# Patient Record
Sex: MALE | Race: WHITE | NOT HISPANIC OR LATINO | Employment: FULL TIME | URBAN - NONMETROPOLITAN AREA
[De-identification: names, ages, dates, MRNs, and addresses within clinical notes are randomized per-mention and may not be internally consistent; named-entity substitution may affect disease eponyms.]

---

## 2024-03-15 ENCOUNTER — OFFICE VISIT (OUTPATIENT)
Dept: SURGERY | Facility: CLINIC | Age: 54
End: 2024-03-15
Payer: COMMERCIAL

## 2024-03-15 VITALS
TEMPERATURE: 98 F | HEART RATE: 67 BPM | SYSTOLIC BLOOD PRESSURE: 140 MMHG | DIASTOLIC BLOOD PRESSURE: 72 MMHG | BODY MASS INDEX: 32.51 KG/M2 | HEIGHT: 72 IN | WEIGHT: 240 LBS

## 2024-03-15 DIAGNOSIS — Z98.890 HISTORY OF UMBILICAL HERNIA REPAIR: ICD-10-CM

## 2024-03-15 DIAGNOSIS — Z87.19 HISTORY OF UMBILICAL HERNIA REPAIR: ICD-10-CM

## 2024-03-15 DIAGNOSIS — R10.33 ABDOMINAL WALL PAIN IN PERIUMBILICAL REGION: Primary | ICD-10-CM

## 2024-03-15 PROCEDURE — 99204 OFFICE O/P NEW MOD 45 MIN: CPT | Performed by: SURGERY

## 2024-03-15 RX ORDER — FEBUXOSTAT 40 MG/1
40 TABLET, FILM COATED ORAL DAILY
COMMUNITY

## 2024-03-15 RX ORDER — PREGABALIN 75 MG/1
75 CAPSULE ORAL 3 TIMES DAILY
COMMUNITY
Start: 2024-03-12

## 2024-03-15 ASSESSMENT — ENCOUNTER SYMPTOMS: ABDOMINAL PAIN: 1

## 2024-03-15 NOTE — PROGRESS NOTES
Subjective   Patient ID: Valentin Howard is a 53 y.o. male who presents for abdominal wall pain  HPI   The patient's history dates back to 12 years ago when he underwent surgery for an umbilical hernia at the [facility].  He really did not have much symptoms at that time.  Since then he has had intermittent discomfort he claims to have some left-sided abdominal wall pain that will radiate up towards his upper chest.  He had no other abdominal complaints.    History reviewed. No pertinent past medical history.     Current Outpatient Medications on File Prior to Visit   Medication Sig Dispense Refill    pregabalin (Lyrica) 75 mg capsule Take 1 capsule (75 mg) by mouth 3 times a day.      febuxostat (Uloric) 40 mg tablet Take 1 tablet (40 mg) by mouth once daily.       No current facility-administered medications on file prior to visit.        Review of Systems   Gastrointestinal:  Positive for abdominal pain.   All other systems reviewed and are negative.      Vitals:    03/15/24 1431   BP: 140/72   Pulse: 67   Temp: 36.7 °C (98 °F)        Objective     Physical Exam  Vitals reviewed.   Constitutional:       Appearance: Normal appearance.   HENT:      Head: Normocephalic.   Cardiovascular:      Rate and Rhythm: Normal rate and regular rhythm.      Heart sounds: Normal heart sounds.   Pulmonary:      Effort: Pulmonary effort is normal.      Breath sounds: Normal breath sounds.   Abdominal:      General: Abdomen is flat. There is no distension.      Palpations: Abdomen is soft. There is no mass.      Tenderness: There is no abdominal tenderness. There is no guarding.      Hernia: No hernia is present.   Musculoskeletal:         General: Normal range of motion.      Cervical back: Normal range of motion.   Skin:     General: Skin is warm.   Neurological:      General: No focal deficit present.   Psychiatric:         Mood and Affect: Mood normal.         Problem List Items Addressed This Visit       Abdominal wall  pain in periumbilical region - Primary    History of umbilical hernia repair        Assessment/Plan   Plan-recommend CT of the abdomen pelvis without contrast  Follow-up in 4 weeks.    Jamshid Cartwright MD

## 2024-03-15 NOTE — PATIENT INSTRUCTIONS
You have some abdominal pain and had a previous hernia repair 12 years ago.  Will obtain your notes from the [facility] system.  I like to obtain a CT scan of the abdomen pelvis.  I will follow-up with you in 4 weeks

## 2024-03-25 ENCOUNTER — HOSPITAL ENCOUNTER (OUTPATIENT)
Dept: RADIOLOGY | Facility: HOSPITAL | Age: 54
Discharge: HOME | End: 2024-03-25
Payer: COMMERCIAL

## 2024-03-25 DIAGNOSIS — Z87.19 HISTORY OF UMBILICAL HERNIA REPAIR: ICD-10-CM

## 2024-03-25 DIAGNOSIS — R10.33 ABDOMINAL WALL PAIN IN PERIUMBILICAL REGION: ICD-10-CM

## 2024-03-25 DIAGNOSIS — Z98.890 HISTORY OF UMBILICAL HERNIA REPAIR: ICD-10-CM

## 2024-03-25 PROCEDURE — 74176 CT ABD & PELVIS W/O CONTRAST: CPT

## 2024-03-25 PROCEDURE — 74176 CT ABD & PELVIS W/O CONTRAST: CPT | Performed by: RADIOLOGY

## 2024-04-12 ENCOUNTER — OFFICE VISIT (OUTPATIENT)
Dept: SURGERY | Facility: CLINIC | Age: 54
End: 2024-04-12
Payer: COMMERCIAL

## 2024-04-12 VITALS
SYSTOLIC BLOOD PRESSURE: 155 MMHG | HEART RATE: 74 BPM | HEIGHT: 72 IN | WEIGHT: 241 LBS | TEMPERATURE: 98.4 F | DIASTOLIC BLOOD PRESSURE: 77 MMHG | BODY MASS INDEX: 32.64 KG/M2

## 2024-04-12 DIAGNOSIS — N32.89 BLADDER WALL THICKENING: ICD-10-CM

## 2024-04-12 DIAGNOSIS — M62.00 DIASTASIS OF MUSCLE: Primary | ICD-10-CM

## 2024-04-12 PROCEDURE — 99212 OFFICE O/P EST SF 10 MIN: CPT | Performed by: SURGERY

## 2024-04-12 NOTE — PATIENT INSTRUCTIONS
You do not have an obvious umbilical hernia.  I will follow this conservatively.  The diastases is a normal variant.  You do have some abnormal bladder wall thickening on the CAT scan and we will make a referral to urology for you.  I will see you as needed.

## 2024-04-12 NOTE — PROGRESS NOTES
Patient ID: Valentin Howard is a 53 y.o. male.  Following up for his possible umbilical hernia    Subjective   HPI  Patient had a CT scan which did not confirm an obvious umbilical hernia.  He does have a bulge when he sits up    Review of Systems   All other systems reviewed and are negative.      Objective   Physical Exam  Constitutional:       Appearance: Normal appearance.   Abdominal:      Palpations: Abdomen is soft. There is no mass.      Tenderness: There is no abdominal tenderness. There is no guarding.      Hernia: No hernia is present.     There is a diastases    Review of imaging CT scan performed 3/25/2024  IMPRESSION:  1.  No evidence of umbilical hernia. There are small bilateral fat  filled inguinal hernias.  2. Wall thickening of the anterior and superior wall of the urinary  bladder. Urology consult is recommended.  3. Hepatomegaly with fatty infiltration.  4. A left-sided IVC which is a normal variant.  5. A few bilateral nonobstructing renal calculi measured up to 5 mm.        Problem List Items Addressed This Visit       Diastasis of muscle - Primary    Bladder wall thickening        Assessment/Plan   You do not have an obvious umbilical hernia.  I will follow this conservatively.  The diastases is a normal variant.  You do have some abnormal bladder wall thickening on the CAT scan and we will make a referral to urology for you.  I will see you as needed.

## 2024-05-22 ENCOUNTER — OFFICE VISIT (OUTPATIENT)
Dept: UROLOGY | Facility: CLINIC | Age: 54
End: 2024-05-22
Payer: COMMERCIAL

## 2024-05-22 DIAGNOSIS — N40.1 BENIGN PROSTATIC HYPERPLASIA WITH LOWER URINARY TRACT SYMPTOMS, SYMPTOM DETAILS UNSPECIFIED: ICD-10-CM

## 2024-05-22 DIAGNOSIS — N32.89 BLADDER WALL THICKENING: ICD-10-CM

## 2024-05-22 DIAGNOSIS — N52.9 ERECTILE DYSFUNCTION, UNSPECIFIED ERECTILE DYSFUNCTION TYPE: Primary | ICD-10-CM

## 2024-05-22 DIAGNOSIS — Z12.5 PROSTATE CANCER SCREENING: ICD-10-CM

## 2024-05-22 PROCEDURE — 99204 OFFICE O/P NEW MOD 45 MIN: CPT | Performed by: STUDENT IN AN ORGANIZED HEALTH CARE EDUCATION/TRAINING PROGRAM

## 2024-05-22 RX ORDER — TADALAFIL 5 MG/1
5 TABLET ORAL DAILY
Qty: 30 TABLET | Refills: 11 | Status: SHIPPED | OUTPATIENT
Start: 2024-05-22 | End: 2025-05-22

## 2024-05-22 NOTE — LETTER
May 22, 2024     Jamshid Cartwright MD  [address]  [facility] [address]    Patient: Valentin Howard   YOB: 1970   Date of Visit: 5/22/2024       Dear Dr. Jamshid Cartwright MD:    Thank you for referring Valentin Howard to me for evaluation. Below are my notes for this consultation.  If you have questions, please do not hesitate to call me. I look forward to following your patient along with you.       Sincerely,     Scooby Del Cid MD      CC: No Recipients  ______________________________________________________________________________________    Subjective  Patient ID: Valentin Howard is a 53 y.o. male who presents for bladder wall thickening, referred by Dr. Cartwright.    HPI  53 y.o. male who presents for bladder wall thickening and ED.     No hx of urinary sxs. No dysuria or hematuria.     He had CT a/p ordered by Dr. Cartwright. This showed bladder wall thickening and kidney stones. Has never passed kidney stones.     He reports issues with ED. Difficult to get and also maintain erections. Has not tried medication. No hx of heart issues, on HTN medication.     Has not started PSA testing.     CT a/p 3/25/24.   IMPRESSION:  1.  No evidence of umbilical hernia. There are small bilateral fat  filled inguinal hernias.  2. Wall thickening of the anterior and superior wall of the urinary  bladder. Urology consult is recommended.  3. Hepatomegaly with fatty infiltration.  4. A left-sided IVC which is a normal variant.  5. A few bilateral nonobstructing renal calculi measured up to 5 mm.    Social History     Tobacco Use   • Smoking status: Never   • Smokeless tobacco: Current     Types: Chew   Vaping Use   • Vaping status: Never Used   Substance Use Topics   • Alcohol use: Not Currently   • Drug use: Never       Review of Systems  A complete review of systems was performed. All systems are noted to be negative unless indicated in the history of present illness, impression, active problem  list, or past histories.     Objective  Physical Exam  General: Well developed, well nourished, alert and cooperative, appears in no acute distress  Eyes: Non-injected conjunctiva, sclera clear, no proptosis  Cardiac: Extremities are warm and well perfused. No edema, cyanosis or pallor.   Lungs: Breathing is easy, non-labored. Speaking in clear and complete sentences. Normal diaphragmatic movement.  Abdomen: soft, non-tender  MSK: Ambulatory with steady gait, unassisted  Neuro: alert and oriented to person, place and time  Psych: Demonstrates good judgement and reason, without hallucinations, abnormal affect or abnormal behaviors.  Skin: no obvious lesions, no rashes.  : phallus circumcised, normal in size, no plaques or lesions. Testicles normal in size and texture, no masses  TAI: prostate enlarged, smooth surface, no nodules    Assessment/Plan  53 y.o. male who presents for bladder wall thickening and BPH with early ED.    No hx of urinary sxs. No dysuria or hematuria.     He had CT a/p ordered by Dr. Cartwright. This showed bladder wall thickening and kidney stones. Has never passed kidney stones.     He reports issues with ED. Difficult to get and also maintain erections. Has not tried medication. No hx of heart issues, on HTN medication.     I personally reviewed the medical records of the patient including the note of the referring physician including the CT images as well as report.  Wall thickening of the anterior and superior wall of the urinary  bladder.    We discussed obtaining cystoscopy to rule out any malignancy.     ED:  I explained to the patient that erectile dysfunction can be of organic or nonorganic etiologies. Organic etiologies includes vascular or neuropathic related to vasculopathy, diabetes, metabolic syndrome, smoking, anatomical such as penile plaques that can result in erectile dysfunction and penile curvature. Nonorganic etiologies include psychogenic reasons related to desire, proper  stimulation, and anxiety such as performance anxiety among other reasons.  The available treatments include oral treatment with PDE 5 inhibitors given either as a once daily low-dose or on-demand high-dose, with the possibility to combine both if tolerated. Intra-urethral suppositories are other options but are rarely used. Intracavernosal injections of prostaglandins or mixture of pharmacological agents to achieve erection on demand are usually relied on if PDE 5 inhibitors are not effective. The last resort will be penile prosthesis/implant, of which several models include.  I explained to the patient that I manage the first line of treatment with PDE 5 inhibitors, however I would refer him for a men's health nurse or urologist who specializes in this condition for discussion of more advanced treatment options whenever needed.  I went over the side effects of PDE 5 inhibitors which include headache, nasal congestion, facial flushing, GI discomfort and low back pain, in addition to the more concerning yet rare priapism which consists of more than 4 hours of erection that could lead to penile pain, and would be a medical emergency requiring presentation to the emergency department.  The patient understands the management plan, and asked questions that reflected his engagement in the discussion.    We discussed that, although guidelines indicate to start routine PSA age 55, we can start checking them at this point.     Plan:  PSA now.   Start Cialis 5 mg 1 pill daily.   Cystoscopy at next visit.     Diagnoses and all orders for this visit:  Erectile dysfunction, unspecified erectile dysfunction type  Bladder wall thickening  -     Referral to Urology  -     Cystoscopy; Future  Benign prostatic hyperplasia with lower urinary tract symptoms, symptom details unspecified  -     tadalafil (Cialis) 5 mg tablet; Take 1 tablet (5 mg) by mouth once daily.  Prostate cancer screening  -     Prostate Specific Antigen;  Future    Scribe Attestation  By signing my name below, I, Elodia Sharp, Scribjanelle attest that this documentation has been prepared under the direction and in the presence of Scooby Del Cid MD.

## 2024-05-22 NOTE — PROGRESS NOTES
Subjective   Patient ID: Valetnin Howard is a 53 y.o. male who presents for bladder wall thickening, referred by Dr. Cartwright.    HPI  53 y.o. male who presents for bladder wall thickening and ED.     No hx of urinary sxs. No dysuria or hematuria.     He had CT a/p ordered by Dr. Cartwright. This showed bladder wall thickening and kidney stones. Has never passed kidney stones.     He reports issues with ED. Difficult to get and also maintain erections. Has not tried medication. No hx of heart issues, on HTN medication.     Has not started PSA testing.     CT a/p 3/25/24.   IMPRESSION:  1.  No evidence of umbilical hernia. There are small bilateral fat  filled inguinal hernias.  2. Wall thickening of the anterior and superior wall of the urinary  bladder. Urology consult is recommended.  3. Hepatomegaly with fatty infiltration.  4. A left-sided IVC which is a normal variant.  5. A few bilateral nonobstructing renal calculi measured up to 5 mm.    Social History     Tobacco Use    Smoking status: Never    Smokeless tobacco: Current     Types: Chew   Vaping Use    Vaping status: Never Used   Substance Use Topics    Alcohol use: Not Currently    Drug use: Never       Review of Systems  A complete review of systems was performed. All systems are noted to be negative unless indicated in the history of present illness, impression, active problem list, or past histories.     Objective   Physical Exam  General: Well developed, well nourished, alert and cooperative, appears in no acute distress  Eyes: Non-injected conjunctiva, sclera clear, no proptosis  Cardiac: Extremities are warm and well perfused. No edema, cyanosis or pallor.   Lungs: Breathing is easy, non-labored. Speaking in clear and complete sentences. Normal diaphragmatic movement.  Abdomen: soft, non-tender  MSK: Ambulatory with steady gait, unassisted  Neuro: alert and oriented to person, place and time  Psych: Demonstrates good judgement and reason, without hallucinations,  abnormal affect or abnormal behaviors.  Skin: no obvious lesions, no rashes.  : phallus circumcised, normal in size, no plaques or lesions. Testicles normal in size and texture, no masses  TAI: prostate enlarged, smooth surface, no nodules    Assessment/Plan   53 y.o. male who presents for bladder wall thickening and BPH with early ED.    No hx of urinary sxs. No dysuria or hematuria.     He had CT a/p ordered by Dr. Cartwright. This showed bladder wall thickening and kidney stones. Has never passed kidney stones.     He reports issues with ED. Difficult to get and also maintain erections. Has not tried medication. No hx of heart issues, on HTN medication.     I personally reviewed the medical records of the patient including the note of the referring physician including the CT images as well as report.  Wall thickening of the anterior and superior wall of the urinary  bladder.    We discussed obtaining cystoscopy to rule out any malignancy.     ED:  I explained to the patient that erectile dysfunction can be of organic or nonorganic etiologies. Organic etiologies includes vascular or neuropathic related to vasculopathy, diabetes, metabolic syndrome, smoking, anatomical such as penile plaques that can result in erectile dysfunction and penile curvature. Nonorganic etiologies include psychogenic reasons related to desire, proper stimulation, and anxiety such as performance anxiety among other reasons.  The available treatments include oral treatment with PDE 5 inhibitors given either as a once daily low-dose or on-demand high-dose, with the possibility to combine both if tolerated. Intra-urethral suppositories are other options but are rarely used. Intracavernosal injections of prostaglandins or mixture of pharmacological agents to achieve erection on demand are usually relied on if PDE 5 inhibitors are not effective. The last resort will be penile prosthesis/implant, of which several models include.  I explained to the  patient that I manage the first line of treatment with PDE 5 inhibitors, however I would refer him for a men's health nurse or urologist who specializes in this condition for discussion of more advanced treatment options whenever needed.  I went over the side effects of PDE 5 inhibitors which include headache, nasal congestion, facial flushing, GI discomfort and low back pain, in addition to the more concerning yet rare priapism which consists of more than 4 hours of erection that could lead to penile pain, and would be a medical emergency requiring presentation to the emergency department.  The patient understands the management plan, and asked questions that reflected his engagement in the discussion.    We discussed that, although guidelines indicate to start routine PSA age 55, we can start checking them at this point.     Plan:  PSA now.   Start Cialis 5 mg 1 pill daily.   Cystoscopy at next visit.     Diagnoses and all orders for this visit:  Erectile dysfunction, unspecified erectile dysfunction type  Bladder wall thickening  -     Referral to Urology  -     Cystoscopy; Future  Benign prostatic hyperplasia with lower urinary tract symptoms, symptom details unspecified  -     tadalafil (Cialis) 5 mg tablet; Take 1 tablet (5 mg) by mouth once daily.  Prostate cancer screening  -     Prostate Specific Antigen; Future    Scribe Attestation  By signing my name below, IElodia Scribe attest that this documentation has been prepared under the direction and in the presence of Scooby Del Cid MD.

## 2024-06-28 ENCOUNTER — APPOINTMENT (OUTPATIENT)
Dept: UROLOGY | Facility: CLINIC | Age: 54
End: 2024-06-28
Payer: COMMERCIAL

## 2024-06-28 DIAGNOSIS — N32.89 BLADDER WALL THICKENING: ICD-10-CM

## 2024-06-28 DIAGNOSIS — R39.9 LOWER URINARY TRACT SYMPTOMS (LUTS): Primary | ICD-10-CM

## 2024-06-28 LAB
POC APPEARANCE, URINE: CLEAR
POC BILIRUBIN, URINE: ABNORMAL
POC BLOOD, URINE: NEGATIVE
POC COLOR, URINE: YELLOW
POC GLUCOSE, URINE: NEGATIVE MG/DL
POC KETONES, URINE: ABNORMAL MG/DL
POC LEUKOCYTES, URINE: NEGATIVE
POC NITRITE,URINE: NEGATIVE
POC PH, URINE: 6 PH
POC PROTEIN, URINE: NEGATIVE MG/DL
POC SPECIFIC GRAVITY, URINE: 1.02
POC UROBILINOGEN, URINE: 1 EU/DL

## 2024-06-28 PROCEDURE — 99213 OFFICE O/P EST LOW 20 MIN: CPT | Performed by: STUDENT IN AN ORGANIZED HEALTH CARE EDUCATION/TRAINING PROGRAM

## 2024-06-28 PROCEDURE — 52000 CYSTOURETHROSCOPY: CPT | Performed by: STUDENT IN AN ORGANIZED HEALTH CARE EDUCATION/TRAINING PROGRAM

## 2024-06-28 NOTE — PROGRESS NOTES
Patient ID: Valentin Howard is a 53 y.o. male.    Procedures  PROCEDURE NOTE:    PREOPERATIVE DIAGNOSIS:  Bladder wall thickening    POSTOPERATIVE DIAGNOSIS:  Same    OPERATION:  Flexible Cystourethroscopy      SURGEON:  Scooby Del Cid MD    ANESTHESIA:  2%  lidocaine jelly    COMPLICATIONS:  None    EBL: Minimal    SPECIMEN:  Voided urine was not collected and submitted for cytology.    DISPOSITION:  The patient was discharged home after the procedure, per routine.    INDICATIONS: :  Mr. Howard is a 53 y.o. patient with a history of bladder wall thickening who presents today for Cystoscopy.     The indications, risks and benefits of this procedure were discussed with the patient, consent was obtained prior to the procedure, and to the best of my judgement the patient seemed to understand and agree to the procedure.    PROCEDURE:  The patient  was brought into the procedure suite and informed consent was reviewed and confirmed. Vital signs were obtained prior to the procedure: There were no vitals taken for this visit..  The patient was escorted onto the stretcher, placed supine, prepped with betadine and draped in the usual standard surgical fashion.  Intraurethral 2% viscous lidocaine jelly was used for local analgesia.  A 16 Polish flexible cystourethroscope was inserted into the urethra.   The penile urethra was normal.  The prostate urethra was enlarged.  Upon entering the bladder the entire bladder was surveyed in a 360 degree fashion.  The left and right ureteral orifices were in normal orthotopic position effluxing clear yellow urine, bilaterally.   There was no evidence of any bladder lesions, foreign objects, stones or evidence of any mucosal changes. The cystoscope was then retroflexed.  The bladder neck was then further examined without any evidence of lesions. The scope was then removed and in an antegrade fashion, the urethra and bladder were again resurveyed with no evidence of additional lesions.   The cystoscope was then fully removed.   The patient tolerated the procedure well.  Vitals were stable after the procedure.  The patient was able to void and was discharged home.  Verbal and written Post procedure instructions were reviewed with the patient.    IMPRESSION:  No bladder tumor, signs of obstructing prostate    PLAN:  Continue medical management with cialis 5mg        Subjective   Patient ID: Valentin Howard is a 53 y.o. male.    HPI  53 y.o. male who presents for bladder wall thickening and ED. Presents for cysto.      No hx of urinary sxs. No dysuria or hematuria.      He had CT a/p ordered by Dr. Cartwright. This showed bladder wall thickening and kidney stones. Has never passed kidney stones.      Started on Cialis 5 mg daily for ED. Good response, is happy.     No hx of heart issues, on HTN medication.      Has not started PSA testing. Has order.      CT a/p 3/25/24.   IMPRESSION:  1.  No evidence of umbilical hernia. There are small bilateral fat  filled inguinal hernias.  2. Wall thickening of the anterior and superior wall of the urinary  bladder. Urology consult is recommended.  3. Hepatomegaly with fatty infiltration.  4. A left-sided IVC which is a normal variant.  5. A few bilateral nonobstructing renal calculi measured up to 5 mm.  Review of Systems    Objective   Physical Exam    Assessment/Plan   53 y.o. male who presents for bladder wall thickening and ED. Presents for cysto.      No hx of urinary sxs. No dysuria or hematuria.      He had CT a/p ordered by Dr. Cartwright. This showed bladder wall thickening and kidney stones. Has never passed kidney stones.      Started on Cialis 5 mg daily for ED. Good response, is happy.      Has not started PSA testing. Has order.     Cysto today negative, details as above.     Plan:  Has PSA order.  Continue Cialis 5 mg 1 pill daily.    FUV 1 year.     Diagnoses and all orders for this visit:  Lower urinary tract symptoms (LUTS)  Bladder wall thickening  -      Cystoscopy

## 2025-05-13 DIAGNOSIS — N40.1 BENIGN PROSTATIC HYPERPLASIA WITH LOWER URINARY TRACT SYMPTOMS, SYMPTOM DETAILS UNSPECIFIED: ICD-10-CM

## 2025-05-13 RX ORDER — TADALAFIL 5 MG/1
5 TABLET ORAL DAILY
Qty: 30 TABLET | Refills: 11 | Status: SHIPPED | OUTPATIENT
Start: 2025-05-13

## 2025-06-19 DIAGNOSIS — N40.1 BENIGN PROSTATIC HYPERPLASIA WITH LOWER URINARY TRACT SYMPTOMS, SYMPTOM DETAILS UNSPECIFIED: ICD-10-CM

## 2025-06-19 DIAGNOSIS — Z12.5 PROSTATE CANCER SCREENING: ICD-10-CM

## 2025-06-20 RX ORDER — TADALAFIL 5 MG/1
5 TABLET ORAL DAILY
Qty: 30 TABLET | Refills: 11 | Status: SHIPPED | OUTPATIENT
Start: 2025-06-20

## 2025-06-25 ENCOUNTER — TELEPHONE (OUTPATIENT)
Dept: UROLOGY | Facility: CLINIC | Age: 55
End: 2025-06-25
Payer: COMMERCIAL

## 2025-06-27 ENCOUNTER — APPOINTMENT (OUTPATIENT)
Dept: UROLOGY | Facility: CLINIC | Age: 55
End: 2025-06-27
Payer: COMMERCIAL

## 2025-07-02 ENCOUNTER — TELEPHONE (OUTPATIENT)
Dept: UROLOGY | Facility: CLINIC | Age: 55
End: 2025-07-02
Payer: COMMERCIAL

## 2025-07-02 NOTE — TELEPHONE ENCOUNTER
Attempted to contact patient to move this appt from 07/11/25 to 07/23/25, per Nurse Peng. Left office phone number and business hours. Asked patient to call us back.

## 2025-07-11 ENCOUNTER — APPOINTMENT (OUTPATIENT)
Dept: UROLOGY | Facility: CLINIC | Age: 55
End: 2025-07-11
Payer: COMMERCIAL

## 2025-08-12 ENCOUNTER — APPOINTMENT (OUTPATIENT)
Dept: UROLOGY | Facility: CLINIC | Age: 55
End: 2025-08-12
Payer: COMMERCIAL

## 2025-08-12 DIAGNOSIS — R39.9 URINARY SYMPTOM OR SIGN: ICD-10-CM

## 2025-08-12 DIAGNOSIS — Z12.5 PROSTATE CANCER SCREENING: Primary | ICD-10-CM

## 2025-08-12 DIAGNOSIS — N52.9 ERECTILE DYSFUNCTION, UNSPECIFIED ERECTILE DYSFUNCTION TYPE: ICD-10-CM

## 2025-08-12 LAB
POC APPEARANCE, URINE: CLEAR
POC BILIRUBIN, URINE: NEGATIVE
POC BLOOD, URINE: NEGATIVE
POC COLOR, URINE: ABNORMAL
POC GLUCOSE, URINE: NEGATIVE MG/DL
POC KETONES, URINE: NEGATIVE MG/DL
POC LEUKOCYTES, URINE: NEGATIVE
POC NITRITE,URINE: NEGATIVE
POC PH, URINE: 6 PH
POC PROTEIN, URINE: NEGATIVE MG/DL
POC SPECIFIC GRAVITY, URINE: 1.02
POC UROBILINOGEN, URINE: 0.2 EU/DL

## 2025-08-12 PROCEDURE — 81002 URINALYSIS NONAUTO W/O SCOPE: CPT | Performed by: NURSE PRACTITIONER

## 2025-08-12 PROCEDURE — 99213 OFFICE O/P EST LOW 20 MIN: CPT | Performed by: NURSE PRACTITIONER
